# Patient Record
Sex: MALE | Race: WHITE | NOT HISPANIC OR LATINO | URBAN - METROPOLITAN AREA
[De-identification: names, ages, dates, MRNs, and addresses within clinical notes are randomized per-mention and may not be internally consistent; named-entity substitution may affect disease eponyms.]

---

## 2018-04-16 ENCOUNTER — EMERGENCY (EMERGENCY)
Facility: HOSPITAL | Age: 58
LOS: 1 days | Discharge: HOME | End: 2018-04-16
Attending: EMERGENCY MEDICINE | Admitting: EMERGENCY MEDICINE
Payer: COMMERCIAL

## 2018-04-16 VITALS
TEMPERATURE: 100 F | DIASTOLIC BLOOD PRESSURE: 90 MMHG | SYSTOLIC BLOOD PRESSURE: 155 MMHG | HEART RATE: 89 BPM | RESPIRATION RATE: 18 BRPM | OXYGEN SATURATION: 97 %

## 2018-04-16 VITALS
DIASTOLIC BLOOD PRESSURE: 92 MMHG | WEIGHT: 160.06 LBS | HEART RATE: 98 BPM | TEMPERATURE: 99 F | SYSTOLIC BLOOD PRESSURE: 132 MMHG | RESPIRATION RATE: 17 BRPM | OXYGEN SATURATION: 99 % | HEIGHT: 69 IN

## 2018-04-16 LAB
ALBUMIN SERPL ELPH-MCNC: 4.8 G/DL — SIGNIFICANT CHANGE UP (ref 3.3–5)
ALP SERPL-CCNC: 90 U/L — SIGNIFICANT CHANGE UP (ref 40–120)
ALT FLD-CCNC: 12 U/L RC — SIGNIFICANT CHANGE UP (ref 10–45)
ANION GAP SERPL CALC-SCNC: 14 MMOL/L — SIGNIFICANT CHANGE UP (ref 5–17)
APPEARANCE UR: CLEAR — SIGNIFICANT CHANGE UP
AST SERPL-CCNC: 15 U/L — SIGNIFICANT CHANGE UP (ref 10–40)
BASOPHILS # BLD AUTO: 0 K/UL — SIGNIFICANT CHANGE UP (ref 0–0.2)
BASOPHILS NFR BLD AUTO: 0.4 % — SIGNIFICANT CHANGE UP (ref 0–2)
BILIRUB SERPL-MCNC: 2.5 MG/DL — HIGH (ref 0.2–1.2)
BILIRUB UR-MCNC: NEGATIVE — SIGNIFICANT CHANGE UP
BUN SERPL-MCNC: 12 MG/DL — SIGNIFICANT CHANGE UP (ref 7–23)
CALCIUM SERPL-MCNC: 9.8 MG/DL — SIGNIFICANT CHANGE UP (ref 8.4–10.5)
CHLORIDE SERPL-SCNC: 99 MMOL/L — SIGNIFICANT CHANGE UP (ref 96–108)
CK MB CFR SERPL CALC: 1.1 NG/ML — SIGNIFICANT CHANGE UP (ref 0–6.7)
CK SERPL-CCNC: 70 U/L — SIGNIFICANT CHANGE UP (ref 30–200)
CO2 SERPL-SCNC: 25 MMOL/L — SIGNIFICANT CHANGE UP (ref 22–31)
COLOR SPEC: YELLOW — SIGNIFICANT CHANGE UP
CREAT SERPL-MCNC: 0.98 MG/DL — SIGNIFICANT CHANGE UP (ref 0.5–1.3)
DIFF PNL FLD: NEGATIVE — SIGNIFICANT CHANGE UP
EOSINOPHIL # BLD AUTO: 0 K/UL — SIGNIFICANT CHANGE UP (ref 0–0.5)
EOSINOPHIL NFR BLD AUTO: 0.2 % — SIGNIFICANT CHANGE UP (ref 0–6)
GLUCOSE SERPL-MCNC: 114 MG/DL — HIGH (ref 70–99)
GLUCOSE UR QL: NEGATIVE — SIGNIFICANT CHANGE UP
HCT VFR BLD CALC: 40.9 % — SIGNIFICANT CHANGE UP (ref 39–50)
HGB BLD-MCNC: 13.2 G/DL — SIGNIFICANT CHANGE UP (ref 13–17)
KETONES UR-MCNC: NEGATIVE — SIGNIFICANT CHANGE UP
LEUKOCYTE ESTERASE UR-ACNC: NEGATIVE — SIGNIFICANT CHANGE UP
LIDOCAIN IGE QN: 16 U/L — SIGNIFICANT CHANGE UP (ref 7–60)
LYMPHOCYTES # BLD AUTO: 1.1 K/UL — SIGNIFICANT CHANGE UP (ref 1–3.3)
LYMPHOCYTES # BLD AUTO: 13.8 % — SIGNIFICANT CHANGE UP (ref 13–44)
MCHC RBC-ENTMCNC: 19.2 PG — LOW (ref 27–34)
MCHC RBC-ENTMCNC: 32.3 GM/DL — SIGNIFICANT CHANGE UP (ref 32–36)
MCV RBC AUTO: 59.4 FL — LOW (ref 80–100)
MONOCYTES # BLD AUTO: 0.3 K/UL — SIGNIFICANT CHANGE UP (ref 0–0.9)
MONOCYTES NFR BLD AUTO: 3.4 % — SIGNIFICANT CHANGE UP (ref 2–14)
NEUTROPHILS # BLD AUTO: 6.8 K/UL — SIGNIFICANT CHANGE UP (ref 1.8–7.4)
NEUTROPHILS NFR BLD AUTO: 82.2 % — HIGH (ref 43–77)
NITRITE UR-MCNC: NEGATIVE — SIGNIFICANT CHANGE UP
PH UR: 6 — SIGNIFICANT CHANGE UP (ref 5–8)
PLATELET # BLD AUTO: 277 K/UL — SIGNIFICANT CHANGE UP (ref 150–400)
POTASSIUM SERPL-MCNC: 3.5 MMOL/L — SIGNIFICANT CHANGE UP (ref 3.5–5.3)
POTASSIUM SERPL-SCNC: 3.5 MMOL/L — SIGNIFICANT CHANGE UP (ref 3.5–5.3)
PROT SERPL-MCNC: 7.7 G/DL — SIGNIFICANT CHANGE UP (ref 6–8.3)
PROT UR-MCNC: 30 MG/DL
RBC # BLD: 6.89 M/UL — HIGH (ref 4.2–5.8)
RBC # FLD: 14.8 % — HIGH (ref 10.3–14.5)
RBC CASTS # UR COMP ASSIST: SIGNIFICANT CHANGE UP /HPF (ref 0–2)
SODIUM SERPL-SCNC: 138 MMOL/L — SIGNIFICANT CHANGE UP (ref 135–145)
SP GR SPEC: 1.02 — SIGNIFICANT CHANGE UP (ref 1.01–1.02)
TROPONIN T SERPL-MCNC: <0.01 NG/ML — SIGNIFICANT CHANGE UP (ref 0–0.06)
TROPONIN T SERPL-MCNC: <0.01 NG/ML — SIGNIFICANT CHANGE UP (ref 0–0.06)
UROBILINOGEN FLD QL: NEGATIVE — SIGNIFICANT CHANGE UP
WBC # BLD: 8.2 K/UL — SIGNIFICANT CHANGE UP (ref 3.8–10.5)
WBC # FLD AUTO: 8.2 K/UL — SIGNIFICANT CHANGE UP (ref 3.8–10.5)
WBC UR QL: SIGNIFICANT CHANGE UP /HPF (ref 0–5)

## 2018-04-16 PROCEDURE — 74177 CT ABD & PELVIS W/CONTRAST: CPT

## 2018-04-16 PROCEDURE — 93005 ELECTROCARDIOGRAM TRACING: CPT

## 2018-04-16 PROCEDURE — 76705 ECHO EXAM OF ABDOMEN: CPT

## 2018-04-16 PROCEDURE — 84484 ASSAY OF TROPONIN QUANT: CPT

## 2018-04-16 PROCEDURE — 70450 CT HEAD/BRAIN W/O DYE: CPT

## 2018-04-16 PROCEDURE — 99284 EMERGENCY DEPT VISIT MOD MDM: CPT

## 2018-04-16 PROCEDURE — 83690 ASSAY OF LIPASE: CPT

## 2018-04-16 PROCEDURE — 85027 COMPLETE CBC AUTOMATED: CPT

## 2018-04-16 PROCEDURE — 99285 EMERGENCY DEPT VISIT HI MDM: CPT | Mod: 25

## 2018-04-16 PROCEDURE — 74177 CT ABD & PELVIS W/CONTRAST: CPT | Mod: 26

## 2018-04-16 PROCEDURE — 71046 X-RAY EXAM CHEST 2 VIEWS: CPT

## 2018-04-16 PROCEDURE — 93010 ELECTROCARDIOGRAM REPORT: CPT

## 2018-04-16 PROCEDURE — 82962 GLUCOSE BLOOD TEST: CPT

## 2018-04-16 PROCEDURE — 82553 CREATINE MB FRACTION: CPT

## 2018-04-16 PROCEDURE — 82550 ASSAY OF CK (CPK): CPT

## 2018-04-16 PROCEDURE — 70450 CT HEAD/BRAIN W/O DYE: CPT | Mod: 26

## 2018-04-16 PROCEDURE — 81001 URINALYSIS AUTO W/SCOPE: CPT

## 2018-04-16 PROCEDURE — 71046 X-RAY EXAM CHEST 2 VIEWS: CPT | Mod: 26

## 2018-04-16 PROCEDURE — 80053 COMPREHEN METABOLIC PANEL: CPT

## 2018-04-16 NOTE — ED ADULT NURSE NOTE - OBJECTIVE STATEMENT
57 year old male alert and oriented x 4 came to the ED s/p syncope this morning.  Patient got up around 0130 to urinate and said he felt dizzy, nauseated and diaphoretic and went to grab the trash can and fell forward onto his right side.  Patient said he was not on the floor long and c/o some right shoulder pain.  Abrasions noted on right elbow and right knee s/p fall.  Patient denies; chest pain, shortness of breath, nausea, vomiting, fevers, chills, pain or burning on urination at time of assessment.  Patient has equal and symmetrical chest rise, non labored breathing, abdomen is soft and non-tender, patient moves all 4 extremities and denies numbness or tingling in the extremities.  Patient placed on CM in ST and IV established in right AC #20.  Safety ensured.

## 2018-04-16 NOTE — ED PROVIDER NOTE - ABDOMINAL EXAM
soft/nontender.../nondistended/Negative Mcburney's point tenderness, negative Obturator sign, negative psoas sign, negative Mcburney soft/nondistended/(+) mild discomfort RLQ. Negative Mcburney's point tenderness, negative Obturator sign, negative psoas sign, negative Mcburney

## 2018-04-16 NOTE — CONSULT NOTE ADULT - SUBJECTIVE AND OBJECTIVE BOX
[facility] General Surgery Consultation       HPI:  This is a 57 y.o male with hx of diverticulosis and HTN presents to ED c/o light headiness and syncopal episode this morning. He began to feel dizzy while urinating and while walking to leave the bathroom, fainted.  He reports having chronic abdominal discomfort. The discomfort is in the lower abdomen. No vomiting but mild nausea when he was nauseated. He has a bowel movement today. No recent colds. No fevers or chills.     PAST MEDICAL & SURGICAL HISTORY:  Diverticulitis  HTN    No significant past surgical history    FAMILY HISTORY: not contributory     SOCIAL HISTORY: not smoker     MEDICATIONS : amlodipine     Allergies: penicillin (Unknown)      Vital Signs Last 24 Hrs  T(C): 37.7 (2018 16:16), Max: 37.7 (2018 16:16)  T(F): 99.8 (2018 16:16), Max: 99.8 (2018 16:16)  HR: 89 (2018 16:16) (87 - 105)  BP: 155/90 (2018 16:16) (132/92 - 155/90)  BP(mean): --  RR: 18 (2018 16:16) (16 - 18)  SpO2: 97% (2018 16:16) (97% - 99%)  Daily Height in cm: 175.26 (2018 09:56)    Daily     General:  NAD  Neurology: A&Ox3  Respiratory: CTA B/L  CV: RRR, S1S2, no murmur  Abdominal: Soft, NT, ND no palpable mass  MSK: No edema                            13.2   8.2   )-----------( 277      ( 2018 10:49 )             40.9     04-16    138  |  99  |  12  ----------------------------<  114<H>  3.5   |  25  |  0.98    Ca    9.8      2018 10:49    TPro  7.7  /  Alb  4.8  /  TBili  2.5<H>  /  DBili  x   /  AST  15  /  ALT  12  /  AlkPhos  90  04-16      Urinalysis Basic - ( 2018 12:08 )    Color: Yellow / Appearance: Clear / S.020 / pH: x  Gluc: x / Ketone: Negative  / Bili: Negative / Urobili: Negative   Blood: x / Protein: 30 mg/dL / Nitrite: Negative   Leuk Esterase: Negative / RBC: 0-2 /HPF / WBC 6-10 /HPF   Sq Epi: x / Non Sq Epi: x / Bacteria: x      Radiographic Findings:   CT abdomen:   IMPRESSION:     No acute pathology.    Short segment intussusception involving small bowel in the left   hemiabdomen is likely transient in nature.      Assessment:   Assessment: This is a 57 y.o male with hx of diverticulosis, normal physical exam and labs, has incidental finding of intussusception on CT scan.     - No further imaging required. Patient may be discharged from ED. Recommend close follow up with his gastroenterologist for possible capsule study (patient instructed)     - Discussed with Dr. Lyon

## 2018-04-16 NOTE — ED ADULT TRIAGE NOTE - CHIEF COMPLAINT QUOTE
Patient passed out last night while walking to the bathroom - felt dizzy/lightheaded prior to episode.

## 2018-04-16 NOTE — CONSULT NOTE ADULT - ATTENDING COMMENTS
seen and examined 4/16/2018 @ 1605    57M with recurrent acute diverticulitis visited ER for syncopal episode after urinating, likely vaso-vagal syndrome.  surgery consulted for incidental finding of small bowel intussusception on CT scan.    He has no obstructive symptoms and no evidence of bowel obstruction on CT scan, so I strongly doubt intussusception  I recommended to the patient that he f/u with Manuelito Lee (GI) for the findings on CT scan. I recommended that he undergo capsule endoscopy or small bowel series to assure no small bowel tumor. I also instructed him to return to ER if he has nausea, vomiting or abdominal pain. The patient and his wife understood my recommendations.    Microcytosis with elevated bilirubin and no anemia  -suspect thalassemia

## 2018-04-16 NOTE — ED PROVIDER NOTE - CRANIAL NERVE AND PUPILLARY EXAM
cough reflex intact/corneal reflex intact/gag reflex intact/cranial nerves 2-12 intact/central vision intact/extra-ocular movements intact/tongue is midline

## 2018-04-16 NOTE — ED PROVIDER NOTE - PROGRESS NOTE DETAILS
Attending MD Campos: Discussed with U/S team, irregular GB, see read, will obtain CT A/P.  Dr. Mack discussed with patient for need for follow up regardless of CT A/P read for possible concern for malignancy.  Will discuss with patient again after CT. Attending MD Campos: Short segment intussusception involving small bowel in the left hemiabdomen is likely transient in nature.  Surgery paged.  Will update family. Case discussed w/ Dr. Beltre, Surgery consult for evaluation of Intussusception Attending MD Campos: Seen by Dr. Lyon of surgery in ED, can follow up with GI and return if symptoms develop.  Patient re-evaluated and feeling improved.  No acute issues at  this time.  Lab and radiology tests reviewed with patient and family.  Patient stable for discharge. Follow up instructions given, importance of follow up emphasized, return to ED parameters reviewed and patient verbalized understanding.  All questions answered, all concerns addressed. Attending MD Campos: Seen by Dr. Lyon of surgery in ED, can follow up with GI and return if symptoms develop.  Reminded patient of importance of following up also for U/S findings.  Patient re-evaluated and feeling improved.  No acute issues at  this time.  Lab and radiology tests reviewed with patient and family.  Patient stable for discharge. Follow up instructions given, importance of follow up emphasized, return to ED parameters reviewed and patient verbalized understanding.  All questions answered, all concerns addressed.

## 2018-04-16 NOTE — ED PROVIDER NOTE - OBJECTIVE STATEMENT
Patient is 57 y.o male reports hx of diverticulosis and HTN presents to ED c/o light headiness and syncopal episode x this morning. Pt states he began to feel dizzy while urinating around 1am today, and while walking to leave the bathroom, fainted. Pt states he does not recall how long, but states he feel generally weak and noticed bruising to right elbow and knee. Pt denies numbness, chest pain, sob, dyspnea, abdominal pain, fever, chills, back pain, previous episodes, hx of CVA/TIA, dysuria, hematuria or other associated symptoms. Patient is 57 y.o male reports hx of diverticulosis and HTN presents to ED c/o light headiness and syncopal episode x this morning. Pt states he began to feel dizzy while urinating around 1am today, and while walking to leave the bathroom, fainted. Pt states he does not recall how long, but states he feel generally weak and noticed bruising to right elbow and knee. Patient also reports having occasional right sided abdominal discomfort x few days. Pt denies numbness, chest pain, sob, dyspnea, abdominal pain, fever, chills, back pain, previous episodes, hx of CVA/TIA, dysuria, hematuria or other associated symptoms. Patient is 57 y.o male reports hx of diverticulosis and HTN presents to ED c/o light headiness and syncopal episode x this morning. Pt states he began to feel dizzy while urinating around 1am today, and while walking to leave the bathroom, fainted. Pt states he does not recall how long, but states he feel generally weak and noticed bruising to right elbow and knee. Patient also reports having occasional right sided abdominal discomfort x few days. Pt denies numbness, chest pain, sob, dyspnea, diarrhea, constipation, fever, chills, back pain, previous episodes, hx of CVA/TIA, dysuria, hematuria or other associated symptoms.

## 2018-04-16 NOTE — ED PROVIDER NOTE - GASTROINTESTINAL NEGATIVE STATEMENT, MLM
no abdominal pain, no bloating, no constipation, no diarrhea, no nausea and no vomiting. occasional right sided abdominal pain, no bloating, no constipation, no diarrhea, no nausea and no vomiting.

## 2019-02-15 NOTE — ED PROVIDER NOTE - ATTENDING CONTRIBUTION TO CARE
No pertinent past medical history Attending MD Campos:   I personally have seen and examined this patient.  Physician assistant note reviewed and agree on plan of care and except where noted.  See below for details.     57M with PMH including HTN, diverticulosis, hemorrhoids presents to the ED with syncopal episode.  Reports that 1am last night was urinating when he was feeling lightheaded, dizzy, nauseated.  Reports he bent down to  the pail to vomit when he felt himself fall to the ground and hit his R elbow and R knee.  Denies hitting head.  Reports unknown LOC after that.  Denies chest pain, shortness of breath, palpitations. Denies abdominal pain, nausea, vomiting, diarrhea, blood in stools. Denies dysuria, hematuria, change in urinary habits including frequency, urgency. Attending MD Campos:   I personally have seen and examined this patient.  Physician assistant note reviewed and agree on plan of care and except where noted.  See below for details.     57M with PMH including HTN, diverticulosis, hemorrhoids presents to the ED with syncopal episode.  Reports that 1am last night was urinating when he was feeling lightheaded, dizzy, nauseated.  Reports he bent down to  the pail to vomit when he felt himself fall to the ground and hit his R elbow and R knee.  Denies hitting head.  Reports unknown LOC after that.  Denies chest pain, shortness of breath, palpitations. Denies abdominal pain, nausea, vomiting, diarrhea, blood in stools. Denies dysuria, hematuria, change in urinary habits including frequency, urgency.  On exam, NAD, CN 2-12 grossly intact, head NCAT, PERRL, FROM at neck, no tenderness to palpation or stepoffs along length of spine, lungs CTAB with good inspiratory effort, +S1S2, no m/r/g, abdomen soft with +BS, mild R sided abdominal pain, ND, no CVAT, moving all extremities with 5/5 strength bilateral upper and lower extremities, good and equal  strength bilaterally; A/P: 57M with syncopal episode, Ddx include post micturition syncope, electrolyte abnormalities, cardiac arrythmia/etiology, will obtain labs, enzymes, EKG, CXR, given abdominal pain will obtain U/S and abdominal labs, VBG, reassess

## 2021-09-20 ENCOUNTER — EMERGENCY (EMERGENCY)
Facility: HOSPITAL | Age: 61
LOS: 1 days | Discharge: HOME | End: 2021-09-20
Attending: STUDENT IN AN ORGANIZED HEALTH CARE EDUCATION/TRAINING PROGRAM
Payer: COMMERCIAL

## 2021-09-20 VITALS
SYSTOLIC BLOOD PRESSURE: 113 MMHG | RESPIRATION RATE: 18 BRPM | TEMPERATURE: 99 F | OXYGEN SATURATION: 100 % | DIASTOLIC BLOOD PRESSURE: 74 MMHG | HEART RATE: 72 BPM

## 2021-09-20 VITALS
RESPIRATION RATE: 25 BRPM | TEMPERATURE: 208 F | DIASTOLIC BLOOD PRESSURE: 91 MMHG | HEIGHT: 69 IN | SYSTOLIC BLOOD PRESSURE: 141 MMHG | WEIGHT: 156.97 LBS | HEART RATE: 91 BPM | OXYGEN SATURATION: 100 %

## 2021-09-20 LAB
ALBUMIN SERPL ELPH-MCNC: 4.8 G/DL — SIGNIFICANT CHANGE UP (ref 3.3–5)
ALP SERPL-CCNC: 109 U/L — SIGNIFICANT CHANGE UP (ref 40–120)
ALT FLD-CCNC: 18 U/L — SIGNIFICANT CHANGE UP (ref 10–45)
ANION GAP SERPL CALC-SCNC: 21 MMOL/L — HIGH (ref 5–17)
ANISOCYTOSIS BLD QL: SLIGHT — SIGNIFICANT CHANGE UP
APTT BLD: 29.9 SEC — SIGNIFICANT CHANGE UP (ref 27.5–35.5)
AST SERPL-CCNC: 35 U/L — SIGNIFICANT CHANGE UP (ref 10–40)
BASE EXCESS BLDV CALC-SCNC: 3.1 MMOL/L — HIGH (ref -2–2)
BASOPHILS # BLD AUTO: 0.06 K/UL — SIGNIFICANT CHANGE UP (ref 0–0.2)
BASOPHILS NFR BLD AUTO: 0.9 % — SIGNIFICANT CHANGE UP (ref 0–2)
BILIRUB SERPL-MCNC: 1.4 MG/DL — HIGH (ref 0.2–1.2)
BLD GP AB SCN SERPL QL: NEGATIVE — SIGNIFICANT CHANGE UP
BUN SERPL-MCNC: 12 MG/DL — SIGNIFICANT CHANGE UP (ref 7–23)
CA-I SERPL-SCNC: 1.14 MMOL/L — LOW (ref 1.15–1.33)
CALCIUM SERPL-MCNC: 9.8 MG/DL — SIGNIFICANT CHANGE UP (ref 8.4–10.5)
CHLORIDE BLDV-SCNC: 101 MMOL/L — SIGNIFICANT CHANGE UP (ref 96–108)
CHLORIDE SERPL-SCNC: 98 MMOL/L — SIGNIFICANT CHANGE UP (ref 96–108)
CO2 BLDV-SCNC: 30 MMOL/L — HIGH (ref 22–26)
CO2 SERPL-SCNC: 19 MMOL/L — LOW (ref 22–31)
CREAT SERPL-MCNC: 0.94 MG/DL — SIGNIFICANT CHANGE UP (ref 0.5–1.3)
DACRYOCYTES BLD QL SMEAR: SLIGHT — SIGNIFICANT CHANGE UP
ELLIPTOCYTES BLD QL SMEAR: SIGNIFICANT CHANGE UP
EOSINOPHIL # BLD AUTO: 0.24 K/UL — SIGNIFICANT CHANGE UP (ref 0–0.5)
EOSINOPHIL NFR BLD AUTO: 3.7 % — SIGNIFICANT CHANGE UP (ref 0–6)
GAS PNL BLDV: 135 MMOL/L — LOW (ref 136–145)
GAS PNL BLDV: SIGNIFICANT CHANGE UP
GAS PNL BLDV: SIGNIFICANT CHANGE UP
GLUCOSE BLDV-MCNC: 120 MG/DL — HIGH (ref 70–99)
GLUCOSE SERPL-MCNC: 153 MG/DL — HIGH (ref 70–99)
HCO3 BLDV-SCNC: 28 MMOL/L — SIGNIFICANT CHANGE UP (ref 22–29)
HCT VFR BLD CALC: 38.3 % — LOW (ref 39–50)
HCT VFR BLDA CALC: 34 % — LOW (ref 39–51)
HGB BLD CALC-MCNC: 11.3 G/DL — LOW (ref 12.6–17.4)
HGB BLD-MCNC: 11.8 G/DL — LOW (ref 13–17)
INR BLD: 1.08 RATIO — SIGNIFICANT CHANGE UP (ref 0.88–1.16)
LACTATE BLDV-MCNC: 1.2 MMOL/L — SIGNIFICANT CHANGE UP (ref 0.7–2)
LACTATE BLDV-MCNC: 4 MMOL/L — CRITICAL HIGH (ref 0.7–2)
LIDOCAIN IGE QN: 25 U/L — SIGNIFICANT CHANGE UP (ref 7–60)
LYMPHOCYTES # BLD AUTO: 1.63 K/UL — SIGNIFICANT CHANGE UP (ref 1–3.3)
LYMPHOCYTES # BLD AUTO: 24.8 % — SIGNIFICANT CHANGE UP (ref 13–44)
MANUAL SMEAR VERIFICATION: SIGNIFICANT CHANGE UP
MCHC RBC-ENTMCNC: 18.1 PG — LOW (ref 27–34)
MCHC RBC-ENTMCNC: 30.8 GM/DL — LOW (ref 32–36)
MCV RBC AUTO: 58.7 FL — LOW (ref 80–100)
MICROCYTES BLD QL: SLIGHT — SIGNIFICANT CHANGE UP
MONOCYTES # BLD AUTO: 0.97 K/UL — HIGH (ref 0–0.9)
MONOCYTES NFR BLD AUTO: 14.7 % — HIGH (ref 2–14)
NEUTROPHILS # BLD AUTO: 3.67 K/UL — SIGNIFICANT CHANGE UP (ref 1.8–7.4)
NEUTROPHILS NFR BLD AUTO: 55.9 % — SIGNIFICANT CHANGE UP (ref 43–77)
PCO2 BLDV: 46 MMHG — SIGNIFICANT CHANGE UP (ref 42–55)
PH BLDV: 7.4 — SIGNIFICANT CHANGE UP (ref 7.32–7.43)
PLAT MORPH BLD: NORMAL — SIGNIFICANT CHANGE UP
PLATELET # BLD AUTO: 245 K/UL — SIGNIFICANT CHANGE UP (ref 150–400)
PO2 BLDV: 31 MMHG — SIGNIFICANT CHANGE UP (ref 25–45)
POIKILOCYTOSIS BLD QL AUTO: SIGNIFICANT CHANGE UP
POTASSIUM BLDV-SCNC: 4.2 MMOL/L — SIGNIFICANT CHANGE UP (ref 3.5–5.1)
POTASSIUM SERPL-MCNC: 3.2 MMOL/L — LOW (ref 3.5–5.3)
POTASSIUM SERPL-SCNC: 3.2 MMOL/L — LOW (ref 3.5–5.3)
PROT SERPL-MCNC: 7.4 G/DL — SIGNIFICANT CHANGE UP (ref 6–8.3)
PROTHROM AB SERPL-ACNC: 12.9 SEC — SIGNIFICANT CHANGE UP (ref 10.6–13.6)
RBC # BLD: 6.53 M/UL — HIGH (ref 4.2–5.8)
RBC # FLD: 17.7 % — HIGH (ref 10.3–14.5)
RBC BLD AUTO: ABNORMAL
RH IG SCN BLD-IMP: POSITIVE — SIGNIFICANT CHANGE UP
SAO2 % BLDV: 50.3 % — LOW (ref 67–88)
SARS-COV-2 RNA SPEC QL NAA+PROBE: SIGNIFICANT CHANGE UP
SCHISTOCYTES BLD QL AUTO: SLIGHT — SIGNIFICANT CHANGE UP
SODIUM SERPL-SCNC: 138 MMOL/L — SIGNIFICANT CHANGE UP (ref 135–145)
TARGETS BLD QL SMEAR: SLIGHT — SIGNIFICANT CHANGE UP
TROPONIN T, HIGH SENSITIVITY RESULT: 8 NG/L — SIGNIFICANT CHANGE UP (ref 0–51)
TROPONIN T, HIGH SENSITIVITY RESULT: <6 NG/L — SIGNIFICANT CHANGE UP (ref 0–51)
WBC # BLD: 6.57 K/UL — SIGNIFICANT CHANGE UP (ref 3.8–10.5)
WBC # FLD AUTO: 6.57 K/UL — SIGNIFICANT CHANGE UP (ref 3.8–10.5)

## 2021-09-20 PROCEDURE — 82435 ASSAY OF BLOOD CHLORIDE: CPT

## 2021-09-20 PROCEDURE — 86900 BLOOD TYPING SEROLOGIC ABO: CPT

## 2021-09-20 PROCEDURE — 93005 ELECTROCARDIOGRAM TRACING: CPT

## 2021-09-20 PROCEDURE — 85730 THROMBOPLASTIN TIME PARTIAL: CPT

## 2021-09-20 PROCEDURE — 85014 HEMATOCRIT: CPT

## 2021-09-20 PROCEDURE — 96375 TX/PRO/DX INJ NEW DRUG ADDON: CPT

## 2021-09-20 PROCEDURE — 74174 CTA ABD&PLVS W/CONTRAST: CPT | Mod: 26,MA

## 2021-09-20 PROCEDURE — 71046 X-RAY EXAM CHEST 2 VIEWS: CPT | Mod: 26

## 2021-09-20 PROCEDURE — U0003: CPT

## 2021-09-20 PROCEDURE — 80053 COMPREHEN METABOLIC PANEL: CPT

## 2021-09-20 PROCEDURE — 86901 BLOOD TYPING SEROLOGIC RH(D): CPT

## 2021-09-20 PROCEDURE — 85018 HEMOGLOBIN: CPT

## 2021-09-20 PROCEDURE — 82947 ASSAY GLUCOSE BLOOD QUANT: CPT

## 2021-09-20 PROCEDURE — 85610 PROTHROMBIN TIME: CPT

## 2021-09-20 PROCEDURE — 84295 ASSAY OF SERUM SODIUM: CPT

## 2021-09-20 PROCEDURE — 71046 X-RAY EXAM CHEST 2 VIEWS: CPT

## 2021-09-20 PROCEDURE — 82330 ASSAY OF CALCIUM: CPT

## 2021-09-20 PROCEDURE — 84132 ASSAY OF SERUM POTASSIUM: CPT

## 2021-09-20 PROCEDURE — 83690 ASSAY OF LIPASE: CPT

## 2021-09-20 PROCEDURE — 86850 RBC ANTIBODY SCREEN: CPT

## 2021-09-20 PROCEDURE — 99285 EMERGENCY DEPT VISIT HI MDM: CPT

## 2021-09-20 PROCEDURE — 76705 ECHO EXAM OF ABDOMEN: CPT | Mod: 26,RT

## 2021-09-20 PROCEDURE — 84484 ASSAY OF TROPONIN QUANT: CPT

## 2021-09-20 PROCEDURE — U0005: CPT

## 2021-09-20 PROCEDURE — 74174 CTA ABD&PLVS W/CONTRAST: CPT | Mod: MA

## 2021-09-20 PROCEDURE — 93010 ELECTROCARDIOGRAM REPORT: CPT

## 2021-09-20 PROCEDURE — 85025 COMPLETE CBC W/AUTO DIFF WBC: CPT

## 2021-09-20 PROCEDURE — 83605 ASSAY OF LACTIC ACID: CPT

## 2021-09-20 PROCEDURE — 82803 BLOOD GASES ANY COMBINATION: CPT

## 2021-09-20 PROCEDURE — 96374 THER/PROPH/DIAG INJ IV PUSH: CPT | Mod: XU

## 2021-09-20 PROCEDURE — 99284 EMERGENCY DEPT VISIT MOD MDM: CPT | Mod: 25

## 2021-09-20 PROCEDURE — 76705 ECHO EXAM OF ABDOMEN: CPT

## 2021-09-20 RX ORDER — ONDANSETRON 8 MG/1
4 TABLET, FILM COATED ORAL ONCE
Refills: 0 | Status: COMPLETED | OUTPATIENT
Start: 2021-09-20 | End: 2021-09-20

## 2021-09-20 RX ORDER — POTASSIUM CHLORIDE 20 MEQ
40 PACKET (EA) ORAL ONCE
Refills: 0 | Status: COMPLETED | OUTPATIENT
Start: 2021-09-20 | End: 2021-09-20

## 2021-09-20 RX ORDER — MORPHINE SULFATE 50 MG/1
4 CAPSULE, EXTENDED RELEASE ORAL ONCE
Refills: 0 | Status: DISCONTINUED | OUTPATIENT
Start: 2021-09-20 | End: 2021-09-20

## 2021-09-20 RX ORDER — SODIUM CHLORIDE 9 MG/ML
1000 INJECTION, SOLUTION INTRAVENOUS ONCE
Refills: 0 | Status: COMPLETED | OUTPATIENT
Start: 2021-09-20 | End: 2021-09-20

## 2021-09-20 RX ORDER — FAMOTIDINE 10 MG/ML
20 INJECTION INTRAVENOUS ONCE
Refills: 0 | Status: COMPLETED | OUTPATIENT
Start: 2021-09-20 | End: 2021-09-20

## 2021-09-20 RX ORDER — HYDROMORPHONE HYDROCHLORIDE 2 MG/ML
1 INJECTION INTRAMUSCULAR; INTRAVENOUS; SUBCUTANEOUS ONCE
Refills: 0 | Status: DISCONTINUED | OUTPATIENT
Start: 2021-09-20 | End: 2021-09-20

## 2021-09-20 RX ORDER — SODIUM CHLORIDE 9 MG/ML
1000 INJECTION INTRAMUSCULAR; INTRAVENOUS; SUBCUTANEOUS ONCE
Refills: 0 | Status: COMPLETED | OUTPATIENT
Start: 2021-09-20 | End: 2021-09-20

## 2021-09-20 RX ADMIN — MORPHINE SULFATE 4 MILLIGRAM(S): 50 CAPSULE, EXTENDED RELEASE ORAL at 07:53

## 2021-09-20 RX ADMIN — Medication 30 MILLILITER(S): at 08:30

## 2021-09-20 RX ADMIN — SODIUM CHLORIDE 1000 MILLILITER(S): 9 INJECTION, SOLUTION INTRAVENOUS at 08:47

## 2021-09-20 RX ADMIN — SODIUM CHLORIDE 1000 MILLILITER(S): 9 INJECTION INTRAMUSCULAR; INTRAVENOUS; SUBCUTANEOUS at 07:54

## 2021-09-20 RX ADMIN — ONDANSETRON 4 MILLIGRAM(S): 8 TABLET, FILM COATED ORAL at 07:52

## 2021-09-20 RX ADMIN — Medication 40 MILLIEQUIVALENT(S): at 09:01

## 2021-09-20 RX ADMIN — FAMOTIDINE 20 MILLIGRAM(S): 10 INJECTION INTRAVENOUS at 08:30

## 2021-09-20 NOTE — ED PROVIDER NOTE - ATTENDING CONTRIBUTION TO CARE
Attending Sheela: I performed a history and physical exam of the patient and discussed their management with the resident/fellow/ACP/student. I have reviewed the resident/fellow/ACP/student note and agree with the documented findings and plan of care, except as noted. My medical decision making and observations are found above. Please refer to any progress notes for updates on clinical course.

## 2021-09-20 NOTE — ED PROVIDER NOTE - OBJECTIVE STATEMENT
60y/o M w/ h/o diverticulitis, GERD, celiac artery aneurysm, s/p cholecystectomy 1 wk at [facility] p/w sudden onset epigastric abdominal pain non radiating no exacerbating or alleviating factors. Had pain like this 3-4 times and went away on its own. No chest pain, fevers, chills, n/v, urinary complaints. 60y/o M w/ h/o diverticulitis, GERD, celiac artery aneurysm, s/p cholecystectomy 1 wk at [facility] p/w sudden onset epigastric abdominal pain non radiating no exacerbating or alleviating factors 1 h prior to arrival. Had pain like this 3-4 times and went away on its own. No chest pain, fevers, chills, n/v, urinary complaints.

## 2021-09-20 NOTE — ED PROCEDURE NOTE - PROCEDURE ADDITIONAL DETAILS
Emergency Department Focused Ultrasound performed at patient's bedside for educational purposes. The study will have a follow up study performed. F/U CT a/p

## 2021-09-20 NOTE — ED ADULT NURSE NOTE - OBJECTIVE STATEMENT
62 yo male PMH HTN, BPH, diverticulitis, GERD, celiac artery aneurysm, PSH cholecystectomy x 1 week at  [facility] presents to ED for sudden onset of sharp epigastric pain. Had 3-4 episodes of pain lasting approx 5-10 mins resolved without intervention. Abd soft, nondistended, diffusely tender. Incisions from surgery healing well without signs of infection. Pt writhing in pain on stretchSheela dick MD at bedside. Denies CP, SOB, n/v/d, fevers, chills, urinary symptoms, weakness, fatigue, numbness, tingling in upper and lower extremities, HA, blurry vision. Updated on plan of care.

## 2021-09-20 NOTE — ED PROVIDER NOTE - PHYSICAL EXAMINATION
Gen: uncomfortable appearing, non-toxic appearing  Head: normal appearing  HEENT: normal conjunctiva, oral mucosa moist  Lung: no respiratory distress, speaking in full sentences, CTA b/l     CV: regular rate and rhythm, no murmurs  Abd: soft, non distended, epigastric tenderness  MSK: no visible deformities  Neuro: No focal deficits, AAOx3  Skin: Warm  Psych: normal affect

## 2021-09-20 NOTE — ED PROVIDER NOTE - CLINICAL SUMMARY MEDICAL DECISION MAKING FREE TEXT BOX
62y/o M w/ h/o HTN, diverticulitis, GERD, celiac artery aneurysm, s/p cholecystectomy 1 wk ago p/w sudden onset epigastric abdominal pain with tenderness. DDx vascular (mesenteric ischemia, dissection in celiary artery), cardiac, less ikely infectious (abscess). Plan CTA, preop labs, EKG, cardiac enzymes, pain and antiemetics meds. 60y/o M w/ h/o HTN, diverticulitis, GERD, celiac artery aneurysm, s/p cholecystectomy 1 wk ago p/w sudden onset epigastric abdominal pain with tenderness. DDx vascular (mesenteric ischemia, dissection in celiary artery), cardiac, less ikely infectious (abscess). Plan CTA, preop labs, EKG, cardiac enzymes, pain and antiemetics meds.    Attending Sheela: 60 y/o M w/ PMH of recent cholecystectomy (approx 1 week ago at [facility]), HTN, Divertic, ?celiac artery aneurysm, chronic epigastric pain presenting w/ epigastric pain. Seen in [place]. Reporting at 6am developed severe epigastric pain while driving. Sharp, non radiating. No pain meds prior to arrival. Has hx of similar pain, had GB out as he was told this was the cause of his pain. Had been recovering well up until this morning. Normal BMs and passing gas. On exam uncomfortable appearing. Severe epigastric tenderness on palpation. BPs mildly elevated but symmetric. Given hx, concern for possible celiac aneurysm rupture. Possible mesenteric ischemia. Low suspicion for SBO given no nausea/vomiting and passing gas/BMs. Will eval for ACS. Plan for labs, imaging, meds, EKG. Spoke w/ CT tech to expedite scan. Will reassess the need for additional interventions as clinically warranted. 62y/o M w/ h/o HTN, diverticulitis, GERD, celiac artery aneurysm, s/p cholecystectomy 1 wk ago p/w sudden onset epigastric abdominal pain with tenderness uncomfortable appearing. DDx vascular (mesenteric ischemia, dissection in celiac artery), cardiac, less likely infectious (abscess). Plan CTA, preop labs, EKG, cardiac enzymes, pain and antiemetics meds.    Attending Sheela: 62 y/o M w/ PMH of recent cholecystectomy (approx 1 week ago at [facility]), HTN, Divertic, ?celiac artery aneurysm, chronic epigastric pain presenting w/ epigastric pain. Seen in [place]. Reporting at 6am developed severe epigastric pain while driving. Sharp, non radiating. No pain meds prior to arrival. Has hx of similar pain, had GB out as he was told this was the cause of his pain. Had been recovering well up until this morning. Normal BMs and passing gas. On exam uncomfortable appearing. Severe epigastric tenderness on palpation. BPs mildly elevated but symmetric. Given hx, concern for possible celiac aneurysm rupture. Possible mesenteric ischemia. Low suspicion for SBO given no nausea/vomiting and passing gas/BMs. Will eval for ACS. Plan for labs, imaging, meds, EKG. Spoke w/ CT tech to expedite scan. Will reassess the need for additional interventions as clinically warranted.

## 2021-09-20 NOTE — ED ADULT NURSE NOTE - NSIMPLEMENTINTERV_GEN_ALL_ED
Implemented All Universal Safety Interventions:  [place] to call system. Call bell, personal items and telephone within reach. Instruct patient to call for assistance. Room bathroom lighting operational. Non-slip footwear when patient is off stretcher. Physically safe environment: no spills, clutter or unnecessary equipment. Stretcher in lowest position, wheels locked, appropriate side rails in place.

## 2021-09-20 NOTE — ED PROVIDER NOTE - PATIENT PORTAL LINK FT
You can access the FollowMyHealth Patient Portal offered by [facility] by registering at the following website: [email or web address]. By joining [employer]’s FollowMyHealth portal, you will also be able to view your health information using other applications (apps) compatible with our system.

## 2021-09-20 NOTE — ED ADULT NURSE REASSESSMENT NOTE - NS ED NURSE REASSESS COMMENT FT1
Pt reports mild reduction in pain, still endorsing intermittent episodes of epigastric "burning" pain. Ok to draw labs now per Yogesh FITZGERALD, pt to US, will reassess upon arrival back to ED.

## 2021-09-20 NOTE — ED PROVIDER NOTE - NS ED ROS FT
GENERAL: No fever, no chills  EYES: no change in vision  HEENT: no trouble swallowing, no trouble speaking  CARDIAC: no chest pain, no palpitations  PULMONARY: no cough, no SOB  GI: +abdominal pain, nausea, vomiting, no diarrhea, no constipation  : no dysuria, no frequency, no change in appearance, no odor of urine  SKIN: no rashes  NEURO: no headache, no weakness  MSK: no joint pain

## 2021-09-20 NOTE — ED PROVIDER NOTE - PROGRESS NOTE DETAILS
Yogesh, PGY3: Patient re-evaluated at bedside and is now currently feeling better after treatment. VSS. Time was taken to answer all of patients questions and concerns. trop negx2, lactate cleared, pt symptom free. Return precaution instructions were given and patient understands and feels comfortable with disposition.  will f/u with personal gi doctor

## 2022-05-17 ENCOUNTER — FORM ENCOUNTER (OUTPATIENT)
Age: 62
End: 2022-05-17

## 2022-05-18 ENCOUNTER — APPOINTMENT (OUTPATIENT)
Dept: ORTHOPEDIC SURGERY | Facility: CLINIC | Age: 62
End: 2022-05-18
Payer: COMMERCIAL

## 2022-05-18 ENCOUNTER — TRANSCRIPTION ENCOUNTER (OUTPATIENT)
Age: 62
End: 2022-05-18

## 2022-05-18 ENCOUNTER — APPOINTMENT (OUTPATIENT)
Dept: MRI IMAGING | Facility: CLINIC | Age: 62
End: 2022-05-18
Payer: COMMERCIAL

## 2022-05-18 PROCEDURE — 73030 X-RAY EXAM OF SHOULDER: CPT | Mod: RT

## 2022-05-18 PROCEDURE — 73221 MRI JOINT UPR EXTREM W/O DYE: CPT | Mod: RT

## 2022-05-18 PROCEDURE — 99204 OFFICE O/P NEW MOD 45 MIN: CPT

## 2022-05-18 NOTE — ASSESSMENT
[FreeTextEntry1] : clinically he has a cuff tear + drop arm and not able to act range. will get him into therapy and get stat mri f/u after mri

## 2022-05-18 NOTE — HISTORY OF PRESENT ILLNESS
[de-identified] : 5/18/22: Right shoulder pain onset on Sunday when he through a ball laterally, heard/felt tearing sensation. Limited motion. Reports years of difficulty where the shoulder pain increases with throwing motions. Denies treatment. +ice therapy, and Tylenol use. Pain only with movement. Needs help to lift the right shoulder. RHD. Denies n/t. There are some night symptoms. \par \par PMH HTN \par \par works as a

## 2022-05-18 NOTE — IMAGING
[Right] : right shoulder [FreeTextEntry1] : superior migration of the humeral head consistent with rct

## 2022-05-18 NOTE — PHYSICAL EXAM
[Right] : right shoulder [3 ___] : forward flexion 3[unfilled]/5 [3___] : internal rotation 3[unfilled]/5 [] : no ecchymosis [TWNoteComboBox7] : active forward flexion 0 degrees [TWNoteComboBox4] : passive forward flexion 140 degrees

## 2022-05-25 ENCOUNTER — APPOINTMENT (OUTPATIENT)
Dept: ORTHOPEDIC SURGERY | Facility: CLINIC | Age: 62
End: 2022-05-25
Payer: COMMERCIAL

## 2022-05-25 VITALS — WEIGHT: 155 LBS | HEIGHT: 69 IN | BODY MASS INDEX: 22.96 KG/M2

## 2022-05-25 DIAGNOSIS — M75.41 IMPINGEMENT SYNDROME OF RIGHT SHOULDER: ICD-10-CM

## 2022-05-25 DIAGNOSIS — M12.811 UNSPECIFIED ROTATOR CUFF TEAR OR RUPTURE OF RIGHT SHOULDER, NOT SPECIFIED AS TRAUMATIC: ICD-10-CM

## 2022-05-25 DIAGNOSIS — S46.011A STRAIN OF MUSCLE(S) AND TENDON(S) OF THE ROTATOR CUFF OF RIGHT SHOULDER, INITIAL ENCOUNTER: ICD-10-CM

## 2022-05-25 DIAGNOSIS — I10 ESSENTIAL (PRIMARY) HYPERTENSION: ICD-10-CM

## 2022-05-25 DIAGNOSIS — M75.101 UNSPECIFIED ROTATOR CUFF TEAR OR RUPTURE OF RIGHT SHOULDER, NOT SPECIFIED AS TRAUMATIC: ICD-10-CM

## 2022-05-25 PROCEDURE — 99213 OFFICE O/P EST LOW 20 MIN: CPT

## 2022-05-25 RX ORDER — TAMSULOSIN HYDROCHLORIDE 0.4 MG/1
0.4 CAPSULE ORAL
Refills: 0 | Status: ACTIVE | COMMUNITY

## 2022-05-25 RX ORDER — CHLORDIAZEPOXIDE HYDROCHLORIDE AND CLIDINIUM BROMIDE 5; 2.5 MG/1; MG/1
5-2.5 CAPSULE, GELATIN COATED ORAL
Refills: 0 | Status: ACTIVE | COMMUNITY

## 2022-05-25 RX ORDER — PANTOPRAZOLE SODIUM 40 MG/1
40 GRANULE, DELAYED RELEASE ORAL
Refills: 0 | Status: ACTIVE | COMMUNITY

## 2022-05-25 RX ORDER — AMLODIPINE BESYLATE 5 MG/1
TABLET ORAL
Refills: 0 | Status: ACTIVE | COMMUNITY

## 2022-05-25 NOTE — PHYSICAL EXAM
[Orientated] : orientated [Able to Communicate] : able to communicate [Normal Skin] : normal skin [Right] : right shoulder [3 ___] : forward flexion 3[unfilled]/5 [3___] : internal rotation 3[unfilled]/5 [] : no ecchymosis [TWNoteComboBox7] : active forward flexion 0 degrees [TWNoteComboBox4] : passive forward flexion 140 degrees

## 2022-05-25 NOTE — REASON FOR VISIT
[FreeTextEntry2] : pt is here for follow up tests results of right shoulder. pt states pain level has improved since last visit.

## 2022-05-25 NOTE — DISCUSSION/SUMMARY
[de-identified] : The documentation recorded by the scribe accurately reflects the service I personally performed and the decisions made by me.\par I, Karie Ricci, attest that this documentation has been prepared under the direction and in the presence of Provider Soto Morley MD.\par \par The patient was seen by Soto Morley MD\par

## 2022-05-25 NOTE — DATA REVIEWED
[MRI] : MRI [Right] : of the right [Shoulder] : shoulder [Report was reviewed and noted in the chart] : The report was reviewed and noted in the chart [I reviewed the films/CD and agree] : I reviewed the films/CD and agree [FreeTextEntry1] : Impression: right shoulder\par 1. Full-thickness tearing at the supraspinatus and infraspinatus tendon insertions with 4-5 cm of retraction, \par severe surrounding bursitis, and mild associated muscle atrophy with superior displacement of the humeral head \par and narrowing of the supraspinatus outlet.\par 2. Moderate grade partial tearing of the biceps tendon which appears subluxed medially with moderate \par tenosynovitis.\par 3. Tearing of the superior and anterior labrum with moderate surrounding synovitis, effusion and capsular \par thickening.\par 4. Moderate AC joint arthrosis and lateral acromial bone spurs with mild subdeltoid bursitis laterally.\par 5. No acute fracture.\par Date of Dictation 05/19/2022/Electronically signed by Ernesto Jama MD 05/19/2022 9:19:44 AM

## 2022-05-25 NOTE — ASSESSMENT
[FreeTextEntry1] : Due to size of tears and muscle atrophy arthroscopic intervention is not recommended. \par Shoulder TKA may be in his future. \par Start PT and HEP to improve mechanics and reduce pain.\par Last weeks was intolerable but today feeling slighty better. \par Apply ice to affected area.\par questions addressed. \par

## 2022-11-19 NOTE — ED ADULT NURSE NOTE - RESPIRATORY WDL
7c/EMS
Breathing spontaneous and unlabored. Breath sounds clear and equal bilaterally with regular rhythm.

## 2023-12-04 ENCOUNTER — NON-APPOINTMENT (OUTPATIENT)
Age: 63
End: 2023-12-04